# Patient Record
Sex: FEMALE | NOT HISPANIC OR LATINO | URBAN - METROPOLITAN AREA
[De-identification: names, ages, dates, MRNs, and addresses within clinical notes are randomized per-mention and may not be internally consistent; named-entity substitution may affect disease eponyms.]

---

## 2019-05-01 ENCOUNTER — APPOINTMENT (RX ONLY)
Dept: URBAN - METROPOLITAN AREA CLINIC 173 | Facility: CLINIC | Age: 84
Setting detail: DERMATOLOGY
End: 2019-05-01

## 2019-05-01 DIAGNOSIS — L30.9 DERMATITIS, UNSPECIFIED: ICD-10-CM

## 2019-05-01 DIAGNOSIS — L259 CONTACT DERMATITIS AND OTHER ECZEMA, UNSPECIFIED CAUSE: ICD-10-CM | Status: INADEQUATELY CONTROLLED

## 2019-05-01 PROBLEM — I25.10 ATHEROSCLEROTIC HEART DISEASE OF NATIVE CORONARY ARTERY WITHOUT ANGINA PECTORIS: Status: ACTIVE | Noted: 2019-05-01

## 2019-05-01 PROBLEM — I10 ESSENTIAL (PRIMARY) HYPERTENSION: Status: ACTIVE | Noted: 2019-05-01

## 2019-05-01 PROBLEM — E78.5 HYPERLIPIDEMIA, UNSPECIFIED: Status: ACTIVE | Noted: 2019-05-01

## 2019-05-01 PROBLEM — L85.3 XEROSIS CUTIS: Status: ACTIVE | Noted: 2019-05-01

## 2019-05-01 PROCEDURE — ? PRESCRIPTION

## 2019-05-01 PROCEDURE — ? COUNSELING

## 2019-05-01 PROCEDURE — 99203 OFFICE O/P NEW LOW 30 MIN: CPT | Mod: 25

## 2019-05-01 PROCEDURE — ? INTRAMUSCULAR KENALOG

## 2019-05-01 PROCEDURE — ? PATIENT SPECIFIC COUNSELING

## 2019-05-01 PROCEDURE — 96372 THER/PROPH/DIAG INJ SC/IM: CPT

## 2019-05-01 RX ORDER — TRIAMCINOLONE ACETONIDE 1 MG/G
1 CREAM TOPICAL BID
Qty: 1 | Refills: 1 | Status: ERX | COMMUNITY
Start: 2019-05-01

## 2019-05-01 RX ORDER — IVERMECTIN 3 MG/1
1 TABLET ORAL WEEKLY
Qty: 10 | Refills: 0 | Status: ERX | COMMUNITY
Start: 2019-05-01

## 2019-05-01 RX ADMIN — TRIAMCINOLONE ACETONIDE 1: 1 CREAM TOPICAL at 18:35

## 2019-05-01 RX ADMIN — IVERMECTIN 1: 3 TABLET ORAL at 18:39

## 2019-05-01 ASSESSMENT — LOCATION ZONE DERM
LOCATION ZONE: ARM
LOCATION ZONE: TRUNK

## 2019-05-01 ASSESSMENT — LOCATION DETAILED DESCRIPTION DERM
LOCATION DETAILED: LEFT PROXIMAL POSTERIOR UPPER ARM
LOCATION DETAILED: RIGHT LATERAL BUTTOCK
LOCATION DETAILED: RIGHT MEDIAL SUPERIOR CHEST
LOCATION DETAILED: RIGHT PROXIMAL POSTERIOR UPPER ARM
LOCATION DETAILED: INFERIOR THORACIC SPINE
LOCATION DETAILED: EPIGASTRIC SKIN

## 2019-05-01 ASSESSMENT — LOCATION SIMPLE DESCRIPTION DERM
LOCATION SIMPLE: LEFT POSTERIOR UPPER ARM
LOCATION SIMPLE: CHEST
LOCATION SIMPLE: RIGHT BUTTOCK
LOCATION SIMPLE: RIGHT POSTERIOR UPPER ARM
LOCATION SIMPLE: UPPER BACK
LOCATION SIMPLE: ABDOMEN

## 2019-05-01 NOTE — PROCEDURE: INTRAMUSCULAR KENALOG
Kenalog Preparation: kenalog
Add Option For Additional Mediation: No
Lot # (Optional): [id number]
Concentration (Mg/Ml) Of Additional Medication: 2.5
Detail Level: None
Concentration (Mg/Ml): 40.0
Total Volume (Ccs): 1
Expiration Date (Optional): 4/2020
Consent: The risks of atrophy were reviewed with the patient.
Ndc# (Optional): 3773-2722-32
Administered By (Optional): Dr. Willis

## 2019-05-01 NOTE — PROCEDURE: PATIENT SPECIFIC COUNSELING
Detail Level: Detailed
Patient has a 1-2 month history of an itchy rash over her chest, abdomen, back, arms, and thighs.  Condition has not responded to OTC Aveeno and CeraVe cream.  Exam showed diffused excoriated eczematous papules and patches on the chest, abdomen, back, arms, and thigh.  Two scabies preps were negative.  Differential diagnosis includes atopic dermatitis vs scabies vs non-bullous pemphigoid.  Although the scrapings were negative, I have a high index of suspicion for scabies.  I will start patient on ivermectin 15mg single dose x 2 one week apart along with triamcinolone cream BID.  I also gave her an intramuscular injection of K40.  Return to clinic in 2 weeks.

## 2019-05-17 ENCOUNTER — APPOINTMENT (RX ONLY)
Dept: URBAN - METROPOLITAN AREA CLINIC 173 | Facility: CLINIC | Age: 84
Setting detail: DERMATOLOGY
End: 2019-05-17

## 2019-05-17 DIAGNOSIS — L259 CONTACT DERMATITIS AND OTHER ECZEMA, UNSPECIFIED CAUSE: ICD-10-CM | Status: RESOLVED

## 2019-05-17 PROBLEM — L30.9 DERMATITIS, UNSPECIFIED: Status: ACTIVE | Noted: 2019-05-17

## 2019-05-17 PROCEDURE — 99212 OFFICE O/P EST SF 10 MIN: CPT

## 2019-05-17 PROCEDURE — ? COUNSELING

## 2019-05-17 PROCEDURE — ? PATIENT SPECIFIC COUNSELING

## 2019-05-17 ASSESSMENT — LOCATION DETAILED DESCRIPTION DERM
LOCATION DETAILED: LEFT PROXIMAL POSTERIOR UPPER ARM
LOCATION DETAILED: RIGHT MEDIAL SUPERIOR CHEST
LOCATION DETAILED: INFERIOR THORACIC SPINE
LOCATION DETAILED: EPIGASTRIC SKIN
LOCATION DETAILED: RIGHT PROXIMAL POSTERIOR UPPER ARM

## 2019-05-17 ASSESSMENT — LOCATION ZONE DERM
LOCATION ZONE: TRUNK
LOCATION ZONE: ARM

## 2019-05-17 ASSESSMENT — LOCATION SIMPLE DESCRIPTION DERM
LOCATION SIMPLE: RIGHT POSTERIOR UPPER ARM
LOCATION SIMPLE: UPPER BACK
LOCATION SIMPLE: CHEST
LOCATION SIMPLE: LEFT POSTERIOR UPPER ARM
LOCATION SIMPLE: ABDOMEN

## 2019-05-17 ASSESSMENT — SEVERITY ASSESSMENT: SEVERITY: CLEAR

## 2019-05-17 NOTE — PROCEDURE: PATIENT SPECIFIC COUNSELING
Patient reports that itching and rash has pretty much resolved with treatment prescribed at last visit along with IMK.  Given response to ivermectin, she likely had scabies despite negative scraping.  Return to clinic as needed.
Detail Level: Detailed

## 2019-06-07 ENCOUNTER — APPOINTMENT (RX ONLY)
Dept: URBAN - METROPOLITAN AREA CLINIC 173 | Facility: CLINIC | Age: 84
Setting detail: DERMATOLOGY
End: 2019-06-07

## 2019-06-07 DIAGNOSIS — L259 CONTACT DERMATITIS AND OTHER ECZEMA, UNSPECIFIED CAUSE: ICD-10-CM

## 2019-06-07 DIAGNOSIS — L30.9 DERMATITIS, UNSPECIFIED: ICD-10-CM

## 2019-06-07 PROCEDURE — 11105 PUNCH BX SKIN EA SEP/ADDL: CPT

## 2019-06-07 PROCEDURE — ? BIOPSY BY PUNCH METHOD

## 2019-06-07 PROCEDURE — 11104 PUNCH BX SKIN SINGLE LESION: CPT

## 2019-06-07 PROCEDURE — ? COUNSELING

## 2019-06-07 PROCEDURE — 96372 THER/PROPH/DIAG INJ SC/IM: CPT | Mod: 59

## 2019-06-07 PROCEDURE — ? PATIENT SPECIFIC COUNSELING

## 2019-06-07 PROCEDURE — ? ORDER TESTS

## 2019-06-07 PROCEDURE — ? INTRAMUSCULAR KENALOG

## 2019-06-07 ASSESSMENT — LOCATION SIMPLE DESCRIPTION DERM
LOCATION SIMPLE: LEFT POSTERIOR UPPER ARM
LOCATION SIMPLE: ABDOMEN
LOCATION SIMPLE: LEFT UPPER BACK
LOCATION SIMPLE: CHEST
LOCATION SIMPLE: RIGHT BUTTOCK
LOCATION SIMPLE: RIGHT POSTERIOR UPPER ARM
LOCATION SIMPLE: UPPER BACK

## 2019-06-07 ASSESSMENT — LOCATION DETAILED DESCRIPTION DERM
LOCATION DETAILED: RIGHT MEDIAL SUPERIOR CHEST
LOCATION DETAILED: LEFT PROXIMAL POSTERIOR UPPER ARM
LOCATION DETAILED: EPIGASTRIC SKIN
LOCATION DETAILED: RIGHT PROXIMAL POSTERIOR UPPER ARM
LOCATION DETAILED: INFERIOR THORACIC SPINE
LOCATION DETAILED: LEFT LATERAL UPPER BACK
LOCATION DETAILED: RIGHT LATERAL BUTTOCK

## 2019-06-07 ASSESSMENT — LOCATION ZONE DERM
LOCATION ZONE: ARM
LOCATION ZONE: TRUNK

## 2019-06-07 NOTE — PROCEDURE: INTRAMUSCULAR KENALOG
Detail Level: None
Kenalog Preparation: kenalog
Expiration Date (Optional): 4/2020
Add Option For Additional Mediation: No
Consent: The risks of atrophy were reviewed with the patient.
Lot # (Optional): [id number]
Total Volume (Ccs): 1
Administered By (Optional): Dr. Willis
Ndc# (Optional): 9178-6094-25
Concentration (Mg/Ml): 40.0
Concentration (Mg/Ml) Of Additional Medication: 2.5

## 2019-06-07 NOTE — PROCEDURE: MIPS QUALITY
Detail Level: Detailed
Quality 111:Pneumonia Vaccination Status For Older Adults: Pneumococcal Vaccination Previously Received
Quality 265: Biopsy Follow-Up: Documentation of Patient OR System Reason(s) for not Performing up to Three of the Four Components of the Numerator Instructions: Reviewing, Communicating, Tracking, and/or Documenting Biopsy Results, Patient not Eligible
Quality 110: Preventive Care And Screening: Influenza Immunization: Influenza Immunization Administered during Influenza season

## 2019-06-07 NOTE — PROCEDURE: BIOPSY BY PUNCH METHOD
Additional Anesthesia Volume In Cc (Will Not Render If 0): 0
Consent: Verbal consent was obtained and risks were reviewed including but not limited to scarring, infection, bleeding, scabbing, incomplete removal, nerve damage and allergy to anesthesia.
Biopsy Type: H and E
Bill For Surgical Tray: no
Dressing: bandage
Lab: [id number]
Home Suture Removal Text: Patient was provided a home suture removal kit and will remove their sutures at home.  If they have any questions or difficulties they will call the office.
Detail Level: Detailed
Punch Size In Mm: 4
Anesthesia Volume In Cc (Will Not Render If 0): 0.5
Notification Instructions: Patient will be notified of biopsy results. However, patient instructed to call the office if not contacted within 2 weeks.
Billing Type: Third-Party Bill
Epidermal Sutures: 4-0 Prolene
Wound Care: Petrolatum
Post-Care Instructions: I reviewed with the patient in detail post-care instructions. Patient is to keep the biopsy site dry overnight, and then apply bacitracin twice daily until healed. Patient may apply hydrogen peroxide soaks to remove any crusting.
Anesthesia Type: 1% lidocaine with epinephrine
Hemostasis: None
Was A Bandage Applied: Yes
Billing Type: Third-Party Bill
Biopsy Type: DIF
Lab Facility: [id number]
Lab: [id number]
Home Suture Removal Text: Patient was provided a home suture removal kit and will remove their sutures at home.  If they have any questions or difficulties they will call the office.

## 2019-06-20 ENCOUNTER — APPOINTMENT (RX ONLY)
Dept: URBAN - METROPOLITAN AREA CLINIC 173 | Facility: CLINIC | Age: 84
Setting detail: DERMATOLOGY
End: 2019-06-20

## 2019-06-20 DIAGNOSIS — L259 CONTACT DERMATITIS AND OTHER ECZEMA, UNSPECIFIED CAUSE: ICD-10-CM | Status: INADEQUATELY CONTROLLED

## 2019-06-20 PROBLEM — L30.9 DERMATITIS, UNSPECIFIED: Status: ACTIVE | Noted: 2019-06-20

## 2019-06-20 PROCEDURE — 99213 OFFICE O/P EST LOW 20 MIN: CPT

## 2019-06-20 PROCEDURE — ? PRESCRIPTION

## 2019-06-20 PROCEDURE — ? COUNSELING

## 2019-06-20 PROCEDURE — ? PATIENT SPECIFIC COUNSELING

## 2019-06-20 RX ORDER — PREDNISONE 10 MG/1
TAPERING TABLET ORAL QD
Qty: 50 | Refills: 0 | Status: ERX | COMMUNITY
Start: 2019-06-20

## 2019-06-20 RX ORDER — IVERMECTIN 3 MG/1
1 TABLET ORAL WEEKLY
Qty: 10 | Refills: 0 | Status: ERX

## 2019-06-20 RX ORDER — TRIAMCINOLONE ACETONIDE 1 MG/G
1 CREAM TOPICAL BID
Qty: 1 | Refills: 1 | Status: ERX | COMMUNITY
Start: 2019-06-20

## 2019-06-20 RX ADMIN — PREDNISONE TAPERING: 10 TABLET ORAL at 16:22

## 2019-06-20 RX ADMIN — TRIAMCINOLONE ACETONIDE 1: 1 CREAM TOPICAL at 16:00

## 2019-06-20 ASSESSMENT — LOCATION SIMPLE DESCRIPTION DERM
LOCATION SIMPLE: LEFT POSTERIOR UPPER ARM
LOCATION SIMPLE: ABDOMEN
LOCATION SIMPLE: RIGHT POSTERIOR UPPER ARM
LOCATION SIMPLE: UPPER BACK
LOCATION SIMPLE: CHEST

## 2019-06-20 ASSESSMENT — LOCATION DETAILED DESCRIPTION DERM
LOCATION DETAILED: EPIGASTRIC SKIN
LOCATION DETAILED: RIGHT PROXIMAL POSTERIOR UPPER ARM
LOCATION DETAILED: LEFT PROXIMAL POSTERIOR UPPER ARM
LOCATION DETAILED: RIGHT MEDIAL SUPERIOR CHEST
LOCATION DETAILED: INFERIOR THORACIC SPINE

## 2019-06-20 ASSESSMENT — LOCATION ZONE DERM
LOCATION ZONE: ARM
LOCATION ZONE: TRUNK

## 2019-06-20 NOTE — PROCEDURE: PATIENT SPECIFIC COUNSELING
Detail Level: Detailed
Carry forward from 6/7/2019 visit: Patient has a 1-2 month history of an itchy rash over her chest, abdomen, back, arms, and thighs.  Condition has not responded to OTC Aveeno and CeraVe cream.  Exam showed diffused excoriated eczematous papules and patches on the chest, abdomen, back, arms, and thigh.  Two scabies preps were negative.  Differential diagnosis includes atopic dermatitis vs scabies vs non-bullous pemphigoid.  Although the scrapings were negative, I have a high index of suspicion for scabies.  I will start patient on ivermectin 15mg single dose x 2 one week apart along with triamcinolone cream BID.  I also gave her an intramuscular injection of K40.  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  The histologic differential diagnosis includes an arthropod bite reaction and other dermal hypersensitivity reaction.  CBC and BMP was unremarkable.  Bullous pemphigoid antibodies were also \\nnegative.  Clinical pathologic correlation suggests possible scabies despite treatment with ivermectin prescribed back on 5/1/2019.  Patient reports that her rash and itching has improved but not cleared since last visit (likely due to IMK given).  I will treat her again with another 2 courses of ivermectin along with a 20 day prednisone taper starting at 40 mg daily and tapering by 10 mg every 5 days.  She can continue use of triamcinolone cream; refill given.  Return to clinic in 1 month.

## 2019-07-23 ENCOUNTER — APPOINTMENT (RX ONLY)
Dept: URBAN - METROPOLITAN AREA CLINIC 173 | Facility: CLINIC | Age: 84
Setting detail: DERMATOLOGY
End: 2019-07-23

## 2019-07-23 DIAGNOSIS — L259 CONTACT DERMATITIS AND OTHER ECZEMA, UNSPECIFIED CAUSE: ICD-10-CM

## 2019-07-23 PROBLEM — L30.9 DERMATITIS, UNSPECIFIED: Status: ACTIVE | Noted: 2019-07-23

## 2019-07-23 PROCEDURE — ? COUNSELING

## 2019-07-23 PROCEDURE — 99212 OFFICE O/P EST SF 10 MIN: CPT

## 2019-07-23 PROCEDURE — ? PATIENT SPECIFIC COUNSELING

## 2019-07-23 ASSESSMENT — LOCATION DETAILED DESCRIPTION DERM
LOCATION DETAILED: EPIGASTRIC SKIN
LOCATION DETAILED: RIGHT MEDIAL SUPERIOR CHEST
LOCATION DETAILED: RIGHT PROXIMAL POSTERIOR UPPER ARM
LOCATION DETAILED: INFERIOR THORACIC SPINE
LOCATION DETAILED: LEFT PROXIMAL POSTERIOR UPPER ARM

## 2019-07-23 ASSESSMENT — LOCATION ZONE DERM
LOCATION ZONE: TRUNK
LOCATION ZONE: ARM

## 2019-07-23 ASSESSMENT — LOCATION SIMPLE DESCRIPTION DERM
LOCATION SIMPLE: CHEST
LOCATION SIMPLE: UPPER BACK
LOCATION SIMPLE: ABDOMEN
LOCATION SIMPLE: LEFT POSTERIOR UPPER ARM
LOCATION SIMPLE: RIGHT POSTERIOR UPPER ARM

## 2019-07-23 NOTE — PROCEDURE: PATIENT SPECIFIC COUNSELING
Detail Level: Detailed
Carry forward from 5/1/2019 visit: Patient has a 1-2 month history of an itchy rash over her chest, abdomen, back, arms, and thighs.  Condition has not responded to OTC Aveeno and CeraVe cream.  Exam showed diffused excoriated eczematous papules and patches on the chest, abdomen, back, arms, and thigh.  Two scabies preps were negative.  Differential diagnosis includes atopic dermatitis vs scabies vs non-bullous pemphigoid.  Although the scrapings were negative, I have a high index of suspicion for scabies.  I will start patient on ivermectin 15mg single dose x 2 one week apart along with triamcinolone cream BID.  I also gave her an intramuscular injection of K40.  Return to clinic in 2 weeks.\\n\\nCarry forward from 6/7/2019 visit: Patient reports that her rash has flared about 2 weeks ago. She was cleared and not itching on 5/17/19 follow-up appointment. Exam showed numerous excoriation and scatter erythematous urticarial papules on trunk and extremities. Differential diagnosis BP vs eczema vs maria teresa eruption vs scabies. I now doubt that patient has scabies since she had been treated with ivermectin. I suspect BP. Two punch biopsies were takne for H&E and DIF. I will also get indirect IF studies, CBC, and CMP; order given. I suspect patient got better after first visit from the IMK; IMK given again today whil we await result of biopsy and blood test. She can continue triamcinolone cream. 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  The histologic differential diagnosis includes an arthropod bite reaction and other dermal hypersensitivity reaction.  CBC and BMP was unremarkable.  Bullous pemphigoid antibodies were also negative.  Clinical pathologic correlation suggests possible scabies despite treatment with ivermectin prescribed back on 5/1/2019.  Patient reports that her rash and itching has improved but not cleared since last visit (likely due to IMK given).  I will treat her again with another 2 courses of ivermectin along with a 20 day prednisone taper starting at 40 mg daily and tapering by 10 mg every 5 days.  She can continue use of triamcinolone cream; refill given.  Return to clinic in 1 month.\\n\\nTIRAMY's note (7/23/2019): Rash and itching has resolved since last visit.  She just took her last dose of prednisone today.  She did not have any side effects with the medication. Patient has not been using the triamcinolone cream since she is not itching; I advise to restart triamcinolone cream if rash/itching recurs.  Return to clinic in 3 weeks per patient's request.

## 2019-08-12 RX ORDER — TRIAMCINOLONE ACETONIDE 1 MG/G
CREAM TOPICAL BID
Qty: 1 | Refills: 1 | Status: ERX

## 2021-06-23 ENCOUNTER — OFFICE VISIT (OUTPATIENT)
Dept: URBAN - METROPOLITAN AREA CLINIC 51 | Facility: CLINIC | Age: 86
End: 2021-06-23
Payer: MEDICARE

## 2021-06-23 DIAGNOSIS — H43.313 VITREOUS MEMBRANES AND STRANDS, BILATERAL: ICD-10-CM

## 2021-06-23 DIAGNOSIS — H52.4 PRESBYOPIA: ICD-10-CM

## 2021-06-23 DIAGNOSIS — H35.362 DRUSEN (DEGENERATIVE) OF MACULA, LEFT EYE: ICD-10-CM

## 2021-06-23 DIAGNOSIS — H04.123 TEAR FILM INSUFFICIENCY OF BILATERAL LACRIMAL GLANDS: ICD-10-CM

## 2021-06-23 DIAGNOSIS — H26.493 OTHER SECONDARY CATARACT, BILATERAL: Primary | ICD-10-CM

## 2021-06-23 PROCEDURE — 92004 COMPRE OPH EXAM NEW PT 1/>: CPT | Performed by: OPTOMETRIST

## 2021-06-23 PROCEDURE — 92133 CPTRZD OPH DX IMG PST SGM ON: CPT | Performed by: OPTOMETRIST

## 2021-06-23 PROCEDURE — 92134 CPTRZ OPH DX IMG PST SGM RTA: CPT | Performed by: OPTOMETRIST

## 2021-06-23 ASSESSMENT — INTRAOCULAR PRESSURE
OS: 14
OD: 13

## 2021-06-23 ASSESSMENT — VISUAL ACUITY
OD: 20/20
OS: 20/40

## 2021-06-23 ASSESSMENT — KERATOMETRY
OS: 42.83
OD: 42.84

## 2021-06-23 NOTE — IMPRESSION/PLAN
Impression: Drusen (degenerative) of macula, left eye: H35.362. Plan: No impact on vision, recommend yearly monitoring.

## 2021-06-23 NOTE — IMPRESSION/PLAN
Impression: Tear film insufficiency of bilateral lacrimal glands: H04.123. Plan: Recommend ATs tid-qid both eyes. Warm compresses with massage for 10 min bid. Discussed LL position effect on epiphora, pt aware WC may not completely resolve symptoms. RTC sooner if symptoms worsen or do not improve.

## 2021-06-23 NOTE — IMPRESSION/PLAN
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 Plan: Not symptomatic at this time, monitor.

## 2023-03-20 NOTE — PROCEDURE: PATIENT SPECIFIC COUNSELING
Detail Level: Detailed
Patient reports that her rash has flared about 2 weeks ago.  She was cleared and not itching on 5/17/19 follow-up appointment.  Exam showed numerous excoriation and scatter erythematous urticarial papules on trunk and extremities.  Differential diagnosis BP vs eczema vs maria teresa eruption vs scabies.  I now doubt that patient has scabies since she had been treated with ivermectin. I suspect BP.  Two punch biopsies were takne for H&E and DIF.  I will also get indirect IF studies, CBC, and CMP; order given.  I suspect patient got better after first visit from the IMK; IMK given again today whil we await result of biopsy and blood test.  She can continue triamcinolone cream.  Return to clinic in 2 weeks.
lesions. The patient wishes to proceed.